# Patient Record
Sex: MALE | Race: WHITE | NOT HISPANIC OR LATINO | URBAN - METROPOLITAN AREA
[De-identification: names, ages, dates, MRNs, and addresses within clinical notes are randomized per-mention and may not be internally consistent; named-entity substitution may affect disease eponyms.]

---

## 2019-01-27 ENCOUNTER — HOSPITAL ENCOUNTER (EMERGENCY)
Facility: HOSPITAL | Age: 24
Discharge: HOME | End: 2019-01-27
Attending: EMERGENCY MEDICINE
Payer: OTHER GOVERNMENT

## 2019-01-27 VITALS
BODY MASS INDEX: 24.38 KG/M2 | TEMPERATURE: 99 F | HEART RATE: 63 BPM | WEIGHT: 190 LBS | HEIGHT: 74 IN | OXYGEN SATURATION: 100 % | SYSTOLIC BLOOD PRESSURE: 123 MMHG | DIASTOLIC BLOOD PRESSURE: 71 MMHG | RESPIRATION RATE: 18 BRPM

## 2019-01-27 DIAGNOSIS — S61.215A LACERATION OF LEFT RING FINGER WITHOUT FOREIGN BODY WITHOUT DAMAGE TO NAIL, INITIAL ENCOUNTER: Primary | ICD-10-CM

## 2019-01-27 PROCEDURE — 99284 EMERGENCY DEPT VISIT MOD MDM: CPT | Mod: 25

## 2019-01-27 PROCEDURE — 25000003 PHARM REV CODE 250: Performed by: NURSE PRACTITIONER

## 2019-01-27 PROCEDURE — 12042 INTMD RPR N-HF/GENIT2.6-7.5: CPT

## 2019-01-27 PROCEDURE — 96372 THER/PROPH/DIAG INJ SC/IM: CPT

## 2019-01-27 PROCEDURE — 63600175 PHARM REV CODE 636 W HCPCS: Performed by: NURSE PRACTITIONER

## 2019-01-27 PROCEDURE — 12002 RPR S/N/AX/GEN/TRNK2.6-7.5CM: CPT | Mod: F2

## 2019-01-27 RX ORDER — CEFAZOLIN SODIUM 1 G/3ML
1 INJECTION, POWDER, FOR SOLUTION INTRAMUSCULAR; INTRAVENOUS
Status: COMPLETED | OUTPATIENT
Start: 2019-01-27 | End: 2019-01-27

## 2019-01-27 RX ORDER — AMOXICILLIN AND CLAVULANATE POTASSIUM 875; 125 MG/1; MG/1
1 TABLET, FILM COATED ORAL 2 TIMES DAILY
Qty: 14 TABLET | Refills: 0 | Status: SHIPPED | OUTPATIENT
Start: 2019-01-27

## 2019-01-27 RX ORDER — BACITRACIN ZINC 500 UNIT/G
1 OINTMENT (GRAM) TOPICAL 2 TIMES DAILY
Status: COMPLETED | OUTPATIENT
Start: 2019-01-27 | End: 2019-01-27

## 2019-01-27 RX ORDER — LIDOCAINE HYDROCHLORIDE 10 MG/ML
10 INJECTION INFILTRATION; PERINEURAL
Status: COMPLETED | OUTPATIENT
Start: 2019-01-27 | End: 2019-01-27

## 2019-01-27 RX ADMIN — LIDOCAINE HYDROCHLORIDE 10 ML: 10 INJECTION, SOLUTION INFILTRATION; PERINEURAL at 04:01

## 2019-01-27 RX ADMIN — BACITRACIN 1 TUBE: 500 OINTMENT TOPICAL at 04:01

## 2019-01-27 RX ADMIN — CEFAZOLIN 1 G: 330 INJECTION, POWDER, FOR SOLUTION INTRAMUSCULAR; INTRAVENOUS at 04:01

## 2019-01-27 NOTE — ED PROVIDER NOTES
Encounter Date: 1/27/2019       History     Chief Complaint   Patient presents with    Laceration     ring finger left hand bleeding controlled       23-year-old male with present for emergent evaluation of a left ring finger laceration.  Patient was trying to cut a zip tie upon cutting a zip tie with a pocket night he accidentally cut his finger also.  Patient states that his last tetanus was less than 5 years ago as he is in the  and he has stayed up-to-date.      The history is provided by the patient.     Review of patient's allergies indicates:  No Known Allergies  History reviewed. No pertinent past medical history.  History reviewed. No pertinent surgical history.  History reviewed. No pertinent family history.  Social History     Tobacco Use    Smoking status: Not on file   Substance Use Topics    Alcohol use: Not on file    Drug use: Not on file     Review of Systems   Constitutional: Negative for fever.   HENT: Negative for sore throat.    Respiratory: Negative for shortness of breath.    Cardiovascular: Negative for chest pain.   Gastrointestinal: Negative for nausea.   Genitourinary: Negative for dysuria.   Musculoskeletal: Positive for myalgias. Negative for back pain.   Skin: Positive for color change and wound.   Neurological: Negative for weakness.   Hematological: Does not bruise/bleed easily.   All other systems reviewed and are negative.    Physical Exam     Initial Vitals [01/27/19 1441]   BP Pulse Resp Temp SpO2   (!) 75/36 (!) 58 (!) 22 98.7 °F (37.1 °C) 98 %      MAP       --         Physical Exam    Nursing note and vitals reviewed.  Constitutional: He appears well-developed and well-nourished. He is cooperative.  Non-toxic appearance.   HENT:   Head: Normocephalic and atraumatic.   Nose: Nose normal.   Mouth/Throat: Oropharynx is clear and moist.   Eyes: Conjunctivae and EOM are normal. Pupils are equal, round, and reactive to light.   Cardiovascular: Normal rate, regular rhythm,  S1 normal, S2 normal, normal heart sounds, intact distal pulses and normal pulses. Exam reveals no gallop and no friction rub.    No murmur heard.  Pulmonary/Chest: Breath sounds normal. No respiratory distress. He has no wheezes. He has no rhonchi. He has no rales. He exhibits no tenderness.   Abdominal: Normal appearance.   Musculoskeletal: Normal range of motion.   Neurological: He is alert.   Skin: Laceration noted.   3-3.5 cm laceration noted to right ring finger near the nail bed without nail involvement;  No bone exposed;  See procedure note for details       ED Course   Lac Repair  Date/Time: 1/27/2019 4:11 PM  Performed by: EBENEZER Contreras  Authorized by: Jose Garcia MD   Consent Done: Yes  Consent: Verbal consent obtained.  Risks and benefits: risks, benefits and alternatives were discussed  Consent given by: patient  Patient identity confirmed: verbally with patient  Body area: upper extremity  Location details: left long finger  Laceration length: 3 cm  Foreign bodies: no foreign bodies  Tendon involvement: none  Nerve involvement: none  Anesthesia: digital block    Anesthesia:  Local Anesthetic: lidocaine 1% without epinephrine  Anesthetic total: 6 mL  Preparation: Patient was prepped and draped in the usual sterile fashion.  Amount of cleaning: extensive  Debridement: none  Degree of undermining: none  Skin closure: 5-0 Prolene  Number of sutures: 14  Technique: simple  Approximation: close  Approximation difficulty: simple  Dressing: antibiotic ointment  Patient tolerance: Patient tolerated the procedure well with no immediate complications  Comments: Pt advised that the tip of the finger may not survive and it is possible that it will become necrotic and fall off- pt advised to return to ED with any complications        Labs Reviewed - No data to display       Imaging Results          X-Ray Finger 2 or More Views Left (Final result)  Result time 01/27/19 15:14:07    Final result by  Ian Quintero MD (01/27/19 15:14:07)                 Impression:      1. No acute displaced fracture or dislocation of the left 3rd digit, no radiopaque foreign body.  Clinical correlation needed to exclude exposed bone/nailbed injury.      Electronically signed by: Ian Quintero MD  Date:    01/27/2019  Time:    15:14             Narrative:    EXAMINATION:  XR FINGER 2 OR MORE VIEWS LEFT    CLINICAL HISTORY:  left ring finger laceration;  Laceration without foreign body of left ring finger without damage to nail, initial encounter    COMPARISON:  None    FINDINGS:  Three views left 3rd digit.    No acute displaced fracture or dislocation of the visualized 3rd digit.  There is soft tissue injury about the distal aspect, clinical correlation needed to exclude exposed bone/nailbed injury.                                 Medical Decision Making:   Initial Assessment:    23-year-old male with present for emergent evaluation of a left ring finger laceration.  Patient was trying to cut a zip tie upon cutting a zip tie with a pocket night he accidentally cut his finger also.  Patient states that his last tetanus was less than 5 years ago as he is in the  and he has stayed up-to-date.    Differential Diagnosis:    Laceration, finger fracture, amputation  Clinical Tests:   Radiological Study: Ordered and Reviewed  ED Management:  Patient examined and x-ray ordered.  X-ray negative for foreign bodies or fracture.  Upon exam there is no bone exposed to the lacerated area.  See procedure note for details.  Patient given Ancef IM while in ED and discharged with Augmentin.  Patient advised that the tip of the finger may not survive and it may become necrotic.  Patient given strict return precautions and voiced understanding of all discharge instructions.  Patient stable at discharge.    Upon arrival patient felt like he was going to pass out secondary to seeing his own blood.  Blood pressure responded appropriately  once patient's finger was covered and he no longer visualized his own blood.                    ED Course as of Jan 27 1627   Sun Jan 27, 2019   1504 BP: (!) 75/36 [AT]   1504 Temp: 98.7 °F (37.1 °C) [AT]   1504 Temp src: Oral [AT]   1504 Pulse: (!) 58 [AT]   1504 Resp: (!) 22 [AT]   1504 SpO2: 98 % [AT]   1627 BP: (!) 107/54 [AT]   1627 Pulse: 65 [AT]   1627 SpO2: 100 % [AT]      ED Course User Index  [AT] EBENEZER Contreras     Clinical Impression:   The encounter diagnosis was Laceration of left ring finger without foreign body without damage to nail, initial encounter.                             EBENEZER Contreras  01/27/19 0344

## 2019-01-27 NOTE — ED NOTES
Pt here c/o left 4th finger lac while removing a zip tie. Tissue/skin after lacerated portion is pale --NP looked at it. Nail bed is disturbed. Placed finger in chlorhexidine and saline to soak. Pt states he is up to date on tetanus and has no daily meds.